# Patient Record
Sex: FEMALE | Race: WHITE | NOT HISPANIC OR LATINO | Employment: FULL TIME | URBAN - METROPOLITAN AREA
[De-identification: names, ages, dates, MRNs, and addresses within clinical notes are randomized per-mention and may not be internally consistent; named-entity substitution may affect disease eponyms.]

---

## 2018-12-05 ENCOUNTER — OFFICE VISIT (OUTPATIENT)
Dept: CARDIOLOGY | Facility: CLINIC | Age: 33
End: 2018-12-05

## 2018-12-05 VITALS
RESPIRATION RATE: 16 BRPM | BODY MASS INDEX: 24.91 KG/M2 | HEART RATE: 75 BPM | HEIGHT: 66 IN | DIASTOLIC BLOOD PRESSURE: 80 MMHG | WEIGHT: 155 LBS | SYSTOLIC BLOOD PRESSURE: 112 MMHG

## 2018-12-05 DIAGNOSIS — Z82.49 FAMILY HISTORY OF CORONARY ARTERIOSCLEROSIS: Primary | ICD-10-CM

## 2018-12-05 DIAGNOSIS — Z83.49 FAMILY HISTORY OF HEMOCHROMATOSIS: ICD-10-CM

## 2018-12-05 PROCEDURE — 93000 ELECTROCARDIOGRAM COMPLETE: CPT | Performed by: INTERNAL MEDICINE

## 2018-12-05 PROCEDURE — 99204 OFFICE O/P NEW MOD 45 MIN: CPT | Performed by: INTERNAL MEDICINE

## 2018-12-05 RX ORDER — PRENATAL VIT NO.126/IRON/FOLIC 28MG-0.8MG
TABLET ORAL DAILY
COMMUNITY

## 2018-12-05 NOTE — PROGRESS NOTES
PATIENTINFORMATION    Date of Office Visit: 2018  Encounter Provider: Angi Payne MD  Place of Service: [facility] CARDIOLOGY  Patient Name: Ortega Cho  : 1985    Subjective:     Encounter Date:2018      Patient ID: Ortega Cho is a 33 y.o. female.      History of Present Illness    This is a lady without any personal history but reports a family history of her father requiring urgent treatment for coronary artery disease and was apparently transported by [facility] to the [facility].  He had a bypass.  He was 64 at the time of this event.  He also apparently had hemochromatosis.  She also has a sister who has hemochromatosis.  She is asymptomatic.  She denies palpitations, shortness of breath, edema, lightheadedness, chest pain or fatigue.  She eats a heart healthy diet.  She is not exercising regularly but she says that is because she just had a baby a couple of months ago.        Review of Systems   Constitution: Negative for fever, malaise/fatigue, weight gain and weight loss.   HENT: Negative for ear pain, hearing loss, nosebleeds and sore throat.    Eyes: Negative for double vision, pain, vision loss in left eye and vision loss in right eye.   Cardiovascular:        See history of present illness.   Respiratory: Negative for cough, shortness of breath, sleep disturbances due to breathing, snoring and wheezing.    Endocrine: Negative for cold intolerance, heat intolerance and polyuria.   Skin: Negative for itching, poor wound healing and rash.   Musculoskeletal: Negative for joint pain, joint swelling and myalgias.   Gastrointestinal: Negative for abdominal pain, diarrhea, hematochezia, nausea and vomiting.   Genitourinary: Negative for hematuria and hesitancy.   Neurological: Negative for numbness, paresthesias and seizures.   Psychiatric/Behavioral: Negative for depression. The patient is not nervous/anxious.            ECG 12  "Lead  Date/Time: 12/5/2018 10:10 AM  Performed by: Angi Payne MD  Authorized by: Angi Payne MD   Previous ECG: no previous ECG available  Rhythm: sinus rhythm  BPM: 75  Conduction: conduction normal  ST Segments: ST segments normal  T Waves: T waves normal  Clinical impression: normal ECG               Objective:     /80 (BP Location: Right arm, Patient Position: Sitting, Cuff Size: Adult)   Pulse 75   Resp 16   Ht 166.4 cm (65.5\")   Wt 70.3 kg (155 lb)   BMI 25.40 kg/m²  Body mass index is 25.4 kg/m².     Physical Exam   Constitutional: She appears well-developed.   HENT:   Head: Normocephalic and atraumatic.   Eyes: Conjunctivae and lids are normal. Pupils are equal, round, and reactive to light. Lids are everted and swept, no foreign bodies found.   Neck: Normal range of motion. No JVD present. Carotid bruit is not present. No tracheal deviation present. No thyroid mass present.   Cardiovascular: Normal rate, regular rhythm and normal heart sounds.   Pulses:       Dorsalis pedis pulses are 2+ on the right side, and 2+ on the left side.   Pulmonary/Chest: Effort normal and breath sounds normal.   Abdominal: Normal appearance and bowel sounds are normal.   Musculoskeletal: Normal range of motion.   Neurological: She is alert. She has normal strength.   Skin: Skin is warm, dry and intact.   Psychiatric: She has a normal mood and affect. Her behavior is normal.   Vitals reviewed.          Assessment/Plan:       1.  Family history of coronary artery disease.  This was not premature.  I encouraged a heart healthy diet and regular exercise.  I will check laboratory data including lipid panel.  She has requested that we do the complete breakdown of the particle sizes.  2.  Family history of hemachromatosis and her father and a sister.  She is requesting screening blood work be performed for this.    I will go over the results of lab work when it's available.  She should follow-up with a primary " care provider.        Orders Placed This Encounter   Procedures   • Iron Profile     Standing Status:   Future     Standing Expiration Date:   12/5/2019   • Iron     Standing Status:   Future     Standing Expiration Date:   12/5/2019   • Transferrin     Standing Status:   Future     Standing Expiration Date:   12/5/2019   • Transferrin Saturation     Standing Status:   Future     Standing Expiration Date:   12/5/2019   • Hemochromatosis Mutation     Standing Status:   Future     Standing Expiration Date:   12/5/2019   • NMR LipoProfile     Standing Status:   Future     Standing Expiration Date:   12/5/2019   • Comprehensive Metabolic Panel     Standing Status:   Future     Standing Expiration Date:   12/5/2019   • ECG 12 Lead     This order was created via procedure documentation   • CBC & Differential     Standing Status:   Future     Standing Expiration Date:   12/5/2019     Order Specific Question:   Manual Differential     Answer:   No        Discharge Medications           Accurate as of 12/5/18  1:09 PM. If you have any questions, ask your nurse or doctor.               Continue These Medications      Instructions Start Date   IODINE STRONG PO   Oral      prenatal (CLASSIC) vitamin 28-0.8 MG tablet tablet   Oral, Daily                    Angi Payne MD  12/05/18  1:09 PM

## 2018-12-10 ENCOUNTER — RESULTS ENCOUNTER (OUTPATIENT)
Dept: CARDIOLOGY | Facility: CLINIC | Age: 33
End: 2018-12-10

## 2018-12-10 DIAGNOSIS — Z83.49 FAMILY HISTORY OF HEMOCHROMATOSIS: ICD-10-CM

## 2018-12-12 ENCOUNTER — LAB (OUTPATIENT)
Dept: LAB | Facility: HOSPITAL | Age: 33
End: 2018-12-12

## 2018-12-12 ENCOUNTER — TELEPHONE (OUTPATIENT)
Dept: CARDIOLOGY | Facility: CLINIC | Age: 33
End: 2018-12-12

## 2018-12-12 DIAGNOSIS — Z83.49 FAMILY HISTORY OF HEMOCHROMATOSIS: ICD-10-CM

## 2018-12-12 DIAGNOSIS — Z82.49 FAMILY HISTORY OF CORONARY ARTERIOSCLEROSIS: ICD-10-CM

## 2018-12-12 LAB
ALBUMIN SERPL-MCNC: 4.4 G/DL (ref 3.5–5.2)
ALBUMIN/GLOB SERPL: 1.5 G/DL
ALP SERPL-CCNC: 55 U/L (ref 39–117)
ALT SERPL W P-5'-P-CCNC: 9 U/L (ref 1–33)
ANION GAP SERPL CALCULATED.3IONS-SCNC: 10.1 MMOL/L
AST SERPL-CCNC: 12 U/L (ref 1–32)
BASOPHILS # BLD AUTO: 0.01 10*3/MM3 (ref 0–0.2)
BASOPHILS NFR BLD AUTO: 0.2 % (ref 0–1.5)
BILIRUB SERPL-MCNC: 0.5 MG/DL (ref 0.1–1.2)
BUN BLD-MCNC: 17 MG/DL (ref 6–20)
BUN/CREAT SERPL: 27 (ref 7–25)
CALCIUM SPEC-SCNC: 9 MG/DL (ref 8.6–10.5)
CHLORIDE SERPL-SCNC: 105 MMOL/L (ref 98–107)
CO2 SERPL-SCNC: 23.9 MMOL/L (ref 22–29)
CREAT BLD-MCNC: 0.63 MG/DL (ref 0.57–1)
DEPRECATED RDW RBC AUTO: 38.3 FL (ref 37–54)
EOSINOPHIL # BLD AUTO: 0.08 10*3/MM3 (ref 0–0.7)
EOSINOPHIL NFR BLD AUTO: 1.9 % (ref 0.3–6.2)
ERYTHROCYTE [DISTWIDTH] IN BLOOD BY AUTOMATED COUNT: 11.8 % (ref 11.7–13)
GFR SERPL CREATININE-BSD FRML MDRD: 110 ML/MIN/1.73
GLOBULIN UR ELPH-MCNC: 2.9 GM/DL
GLUCOSE BLD-MCNC: 83 MG/DL (ref 65–99)
HCT VFR BLD AUTO: 40.4 % (ref 35.6–45.5)
HGB BLD-MCNC: 13.9 G/DL (ref 11.9–15.5)
IMM GRANULOCYTES # BLD: 0 10*3/MM3 (ref 0–0.03)
IMM GRANULOCYTES NFR BLD: 0 % (ref 0–0.5)
IRON 24H UR-MRATE: 72 MCG/DL (ref 37–145)
IRON SATN MFR SERPL: 23 % (ref 20–50)
LYMPHOCYTES # BLD AUTO: 1.38 10*3/MM3 (ref 0.9–4.8)
LYMPHOCYTES NFR BLD AUTO: 33.2 % (ref 19.6–45.3)
MCH RBC QN AUTO: 30.8 PG (ref 26.9–32)
MCHC RBC AUTO-ENTMCNC: 34.4 G/DL (ref 32.4–36.3)
MCV RBC AUTO: 89.4 FL (ref 80.5–98.2)
MONOCYTES # BLD AUTO: 0.23 10*3/MM3 (ref 0.2–1.2)
MONOCYTES NFR BLD AUTO: 5.5 % (ref 5–12)
NEUTROPHILS # BLD AUTO: 2.46 10*3/MM3 (ref 1.9–8.1)
NEUTROPHILS NFR BLD AUTO: 59.2 % (ref 42.7–76)
PLATELET # BLD AUTO: 268 10*3/MM3 (ref 140–500)
PMV BLD AUTO: 9.7 FL (ref 6–12)
POTASSIUM BLD-SCNC: 4.1 MMOL/L (ref 3.5–5.2)
PROT SERPL-MCNC: 7.3 G/DL (ref 6–8.5)
RBC # BLD AUTO: 4.52 10*6/MM3 (ref 3.9–5.2)
SODIUM BLD-SCNC: 139 MMOL/L (ref 136–145)
TIBC SERPL-MCNC: 313 MCG/DL
TRANSFERRIN SERPL-MCNC: 210 MG/DL (ref 200–360)
WBC NRBC COR # BLD: 4.16 10*3/MM3 (ref 4.5–10.7)

## 2018-12-12 PROCEDURE — 84466 ASSAY OF TRANSFERRIN: CPT

## 2018-12-12 PROCEDURE — 81256 HFE GENE: CPT

## 2018-12-12 PROCEDURE — 83540 ASSAY OF IRON: CPT

## 2018-12-12 PROCEDURE — 83704 LIPOPROTEIN BLD QUAN PART: CPT

## 2018-12-12 PROCEDURE — 85025 COMPLETE CBC W/AUTO DIFF WBC: CPT

## 2018-12-12 PROCEDURE — 80053 COMPREHEN METABOLIC PANEL: CPT

## 2018-12-12 PROCEDURE — 80061 LIPID PANEL: CPT

## 2018-12-12 PROCEDURE — 36415 COLL VENOUS BLD VENIPUNCTURE: CPT

## 2018-12-12 NOTE — TELEPHONE ENCOUNTER
Pt is down in outpatient lab. There are orders for an NMR lipid profile but patient wanted to have the advanced HDL/LDL profile. She is currently waiting in the lab now and wanted to know if the order could be switched.     Thanks  SW

## 2018-12-13 ENCOUNTER — TELEPHONE (OUTPATIENT)
Dept: CARDIOLOGY | Facility: CLINIC | Age: 33
End: 2018-12-13

## 2018-12-13 NOTE — TELEPHONE ENCOUNTER
----- Message from Angi Payne MD sent at 12/12/2018  4:45 PM EST -----  Please let her know that her iron studies are normal, kidney function, electrolytes and liver function are all normal.  No signs of anemia.  Mildly low white blood cell count that is not clinically significant.  The other labs are sent out so I will have them back for a while but I will be in touch when I have them.  Please encourage her to get a my chart account. JL

## 2018-12-14 LAB
CHOLEST SERPL-MCNC: 134 MG/DL (ref 100–199)
HDL SERPL-SCNC: 36 UMOL/L
HDLC SERPL-MCNC: 55 MG/DL
LDL-P: 651 NMOL/L
LDLC REAL SIZE PAT SERPL: 20.1 NM
LDLC SERPL CALC-MCNC: 73 MG/DL (ref 0–99)
SMALL LDL-P: 294 NMOL/L
TRIGL SERPL-MCNC: 30 MG/DL (ref 0–149)

## 2018-12-19 LAB — HFE GENE MUT ANL BLD/T: NORMAL

## 2019-01-09 ENCOUNTER — TELEPHONE (OUTPATIENT)
Dept: CARDIOLOGY | Facility: CLINIC | Age: 34
End: 2019-01-09

## 2019-01-09 DIAGNOSIS — Z83.49 FAMILY HISTORY OF HEMOCHROMATOSIS: Primary | ICD-10-CM

## 2019-01-09 NOTE — TELEPHONE ENCOUNTER
I added a ferritin    Tell her I suggest she see a hematologist since I do not treat either low white blood cell counts or hemochromatosis.  I will happily place a referral for that as well.  But, I do not believe the low white blood cell count is clinically significant but they would have to answer her questions on how to improve it.  I don't treat this disorder.  I am a cardiologist.

## 2019-01-09 NOTE — TELEPHONE ENCOUNTER
Pt had requested last week to have her lab work sent to her. She calls today to report that she has received said lab work.    She understood your recommendations from said results but has questions    She would like to know why there was no ferritin level drawn when she requested this? She states with her specific blood disorder that this lab draw can indicate more specifically if she has the disorder and she wants to be completely certain she does not have hemochromatosis mutation.    I was able to look at active orders, and for some reason there is still an active order for transferrin saturation, do you want this to still be drawn? Do you want to add a ferritin level only?    Pt has additional questions  When is it clinically significant for abnormal WBC count?  Why is her current WBC not clinically significant at this point?  What can she do to improve her WBC at home, if anything, since she is currently not taking any medication and does not intend to?

## 2019-01-09 NOTE — TELEPHONE ENCOUNTER
Info to Pt, she will see about getting in with a hematologist she stated she did not need a referral and she stated that she would get the lab work completed.

## 2020-02-25 ENCOUNTER — TELEPHONE (OUTPATIENT)
Dept: CARDIOLOGY | Facility: CLINIC | Age: 35
End: 2020-02-25

## 2020-02-27 NOTE — TELEPHONE ENCOUNTER
I cancelled the order.  Marguerite  
I saw in overdue results that you ordered a Transferrin Saturation on 12/10/18.  Patient has not had this done that I can tell.  Do you still want patient to have this done?  If so I will call pt.  If not, I can cancel the order.  Margueriet  
OK to cancel. FU with PCP  
No

## 2021-04-16 ENCOUNTER — BULK ORDERING (OUTPATIENT)
Dept: CASE MANAGEMENT | Facility: OTHER | Age: 36
End: 2021-04-16

## 2021-04-16 DIAGNOSIS — Z23 IMMUNIZATION DUE: ICD-10-CM
